# Patient Record
Sex: MALE | Race: WHITE | NOT HISPANIC OR LATINO | Employment: OTHER | URBAN - NONMETROPOLITAN AREA
[De-identification: names, ages, dates, MRNs, and addresses within clinical notes are randomized per-mention and may not be internally consistent; named-entity substitution may affect disease eponyms.]

---

## 2022-05-17 ENCOUNTER — TELEPHONE (OUTPATIENT)
Dept: FAMILY MEDICINE CLINIC | Facility: CLINIC | Age: 67
End: 2022-05-17

## 2022-05-26 ENCOUNTER — OFFICE VISIT (OUTPATIENT)
Dept: FAMILY MEDICINE CLINIC | Facility: CLINIC | Age: 67
End: 2022-05-26

## 2022-05-26 VITALS — DIASTOLIC BLOOD PRESSURE: 75 MMHG | HEART RATE: 62 BPM | OXYGEN SATURATION: 99 % | SYSTOLIC BLOOD PRESSURE: 120 MMHG

## 2022-05-26 DIAGNOSIS — I69.354 HEMIPARESIS AFFECTING LEFT SIDE AS LATE EFFECT OF CEREBROVASCULAR ACCIDENT: ICD-10-CM

## 2022-05-26 DIAGNOSIS — R91.1 PULMONARY NODULE: ICD-10-CM

## 2022-05-26 DIAGNOSIS — E78.2 MIXED HYPERLIPIDEMIA: ICD-10-CM

## 2022-05-26 DIAGNOSIS — I10 ESSENTIAL HYPERTENSION: ICD-10-CM

## 2022-05-26 DIAGNOSIS — Z86.73 HX OF TIA (TRANSIENT ISCHEMIC ATTACK) AND STROKE: Primary | ICD-10-CM

## 2022-05-26 DIAGNOSIS — F33.1 MAJOR DEPRESSIVE DISORDER, RECURRENT, MODERATE: ICD-10-CM

## 2022-05-26 DIAGNOSIS — R13.12 OROPHARYNGEAL DYSPHAGIA: ICD-10-CM

## 2022-05-26 PROBLEM — F33.41 RECURRENT MAJOR DEPRESSION IN PARTIAL REMISSION: Status: ACTIVE | Noted: 2022-05-26

## 2022-05-26 PROBLEM — Z72.0 TOBACCO USE: Status: ACTIVE | Noted: 2022-05-26

## 2022-05-26 PROBLEM — J20.9 COPD (CHRONIC OBSTRUCTIVE PULMONARY DISEASE) WITH ACUTE BRONCHITIS: Status: ACTIVE | Noted: 2022-05-26

## 2022-05-26 PROBLEM — J44.0 COPD (CHRONIC OBSTRUCTIVE PULMONARY DISEASE) WITH ACUTE BRONCHITIS: Status: ACTIVE | Noted: 2022-05-26

## 2022-05-26 PROBLEM — R13.10 DYSPHAGIA: Status: ACTIVE | Noted: 2022-05-26

## 2022-05-26 PROBLEM — I63.9 EMBOLIC STROKE: Status: ACTIVE | Noted: 2022-05-26

## 2022-05-26 PROBLEM — R41.841 COGNITIVE COMMUNICATION DISORDER: Status: ACTIVE | Noted: 2022-05-26

## 2022-05-26 PROCEDURE — 36415 COLL VENOUS BLD VENIPUNCTURE: CPT | Performed by: FAMILY MEDICINE

## 2022-05-26 PROCEDURE — 99214 OFFICE O/P EST MOD 30 MIN: CPT | Performed by: FAMILY MEDICINE

## 2022-05-26 RX ORDER — TRAZODONE HYDROCHLORIDE 50 MG/1
TABLET ORAL
COMMUNITY
Start: 2022-03-10 | End: 2022-11-29 | Stop reason: SDUPTHER

## 2022-05-26 RX ORDER — ATORVASTATIN CALCIUM 40 MG/1
40 TABLET, FILM COATED ORAL NIGHTLY
Qty: 90 TABLET | Refills: 1 | Status: SHIPPED | OUTPATIENT
Start: 2022-05-26 | End: 2022-11-28 | Stop reason: SDUPTHER

## 2022-05-26 RX ORDER — ATORVASTATIN CALCIUM 40 MG/1
40 TABLET, FILM COATED ORAL NIGHTLY
COMMUNITY
Start: 2022-05-07 | End: 2022-05-26 | Stop reason: SDUPTHER

## 2022-05-26 RX ORDER — FLUOXETINE HYDROCHLORIDE 20 MG/1
20 CAPSULE ORAL 3 TIMES DAILY
COMMUNITY
Start: 2022-02-27 | End: 2022-08-11

## 2022-05-26 NOTE — PROGRESS NOTES
Chief Complaint  Ear Fullness, Choking, congestion in  lungs, feet and leg pain, and sleeps to much    Subjective          Bon Noble presents to [facility]  Patient comes in complaining that he is having more problems recently with his dysphagia.  He states that he is having some choking symptoms as well and states that he has had some problems with his ears as well he states that otherwise he is doing about his normal thing except he has been sleeping a lot.  His sister does state that he needs repeat CT scan for his pulmonary nodule follow-up which she had a new 1 it was noted 3 months ago on a scan      Objective   Vital Signs:   /75   Pulse 62   SpO2 99%     There is no height or weight on file to calculate BMI.    Review of Systems   Constitutional: Positive for fatigue.   HENT: Negative for congestion, dental problem, ear discharge, ear pain and sore throat.    Respiratory: Positive for cough and choking. Negative for apnea, chest tightness and shortness of breath.    Gastrointestinal: Negative for constipation and nausea.   Endocrine: Negative for polyuria.   Genitourinary: Negative for difficulty urinating.   Musculoskeletal: Positive for gait problem. Negative for arthralgias.   Skin: Negative for rash.   Neurological: Positive for speech difficulty and numbness.   Hematological: Negative for adenopathy.       Past History:  Medical History: has a past medical history of Acute asthmatic bronchitis, Acute cerebrovascular accident (CVA) due to embolism of right middle cerebral artery (HCC), Acute non-recurrent maxillary sinusitis, Advance care planning, At high risk for falls, BMI 26.0-26.9,adult, Cataract of left eye, Chronic fatigue, Chronic osteoarthritis, Colon cancer screening, COPD (chronic obstructive pulmonary disease) with acute bronchitis (HCC), Depression, Dysarthria, Encounter for general adult medical examination w/o abnormal findings, Encounter for  screening for depression, Essential hypertension, Hemiparesis affecting left side as late effect of cerebrovascular accident (HCC), Hemiplegia ([facility]), High risk medication use, History of dysphagia, History of pneumonia, Medicare annual wellness visit, subsequent, Pulmonary nodule, Recurrent major depressive disorder in partial remission ([facility]), Renal insufficiency syndrome, Renal mass, left, Stroke (cerebrum) ([facility]) (07/2020), Tobacco use, and Wheelchair bound.   Surgical History: has no past surgical history on file.         Current Outpatient Medications:   •  atorvastatin (LIPITOR) 40 MG tablet, Take 1 tablet by mouth Every Night., Disp: 90 tablet, Rfl: 1  •  FLUoxetine (PROzac) 20 MG capsule, Take 20 mg by mouth 3 (Three) Times a Day., Disp: , Rfl:   •  traZODone (DESYREL) 50 MG tablet, , Disp: , Rfl:     Allergies: Atorvastatin and Ezetimibe    Physical Exam  Vitals reviewed. Exam conducted with a chaperone present.   Constitutional:       Appearance: Normal appearance.   HENT:      Head: Normocephalic.      Right Ear: Tympanic membrane, ear canal and external ear normal.      Left Ear: Tympanic membrane, ear canal and external ear normal.      Nose: Nose normal.      Mouth/Throat:      Pharynx: Oropharynx is clear.   Eyes:      Pupils: Pupils are equal, round, and reactive to light.   Cardiovascular:      Rate and Rhythm: Normal rate and regular rhythm.      Pulses: Normal pulses.   Pulmonary:      Effort: Pulmonary effort is normal.      Breath sounds: Normal breath sounds.   Abdominal:      General: Abdomen is flat. Bowel sounds are normal.      Palpations: Abdomen is soft.   Musculoskeletal:         General: Normal range of motion.   Skin:     General: Skin is warm and dry.   Neurological:      General: No focal deficit present.      Mental Status: He is alert and oriented to person, place, and time.      Comments: Left side hemiparesis patient also with very limited speaking ability          Result Review :                    Assessment and Plan    Diagnoses and all orders for this visit:    1. Hx of TIA (transient ischemic attack) and stroke (Primary)  Comments:  Patient with left hemiparesis wheelchair-bound continues with difficulties with dysphagia    2. Essential hypertension  Comments:  Patient now off all medications because his blood pressure has gotten normalized    3. Oropharyngeal dysphagia  Comments:  Discussed foods thickening of liquids and other options which are very limited    4. Hemiparesis affecting left side as late effect of cerebrovascular accident (HCC)  Comments:  Stable    5. Mixed hyperlipidemia  Comments:  Blood work and refill meds  Orders:  -     atorvastatin (LIPITOR) 40 MG tablet; Take 1 tablet by mouth Every Night.  Dispense: 90 tablet; Refill: 1  -     Comprehensive Metabolic Panel; Future  -     Lipid Panel; Future  -     Comprehensive Metabolic Panel  -     Lipid Panel    6. Major depressive disorder, recurrent, moderate (HCC)  Comments:  Continue meds did discuss stopping trazodone that helps with his sleep    7. Pulmonary nodule  Comments:  arRange follow-up CT scan  Orders:  -     CT Chest With Contrast Diagnostic; Future              Follow Up   Return in about 3 months (around 8/26/2022).  Patient was given instructions and counseling regarding his condition or for health maintenance advice. Please see specific information pulled into the AVS if appropriate.     Lexa Mohr MD

## 2022-05-27 LAB
ALBUMIN SERPL-MCNC: 4.1 G/DL (ref 3.8–4.8)
ALBUMIN/GLOB SERPL: 1.3 {RATIO} (ref 1.2–2.2)
ALP SERPL-CCNC: 89 IU/L (ref 44–121)
ALT SERPL-CCNC: 20 IU/L (ref 0–44)
AST SERPL-CCNC: 18 IU/L (ref 0–40)
BILIRUB SERPL-MCNC: 0.7 MG/DL (ref 0–1.2)
BUN SERPL-MCNC: 15 MG/DL (ref 8–27)
BUN/CREAT SERPL: 11 (ref 10–24)
CALCIUM SERPL-MCNC: 9.3 MG/DL (ref 8.6–10.2)
CHLORIDE SERPL-SCNC: 104 MMOL/L (ref 96–106)
CHOLEST SERPL-MCNC: 150 MG/DL (ref 100–199)
CO2 SERPL-SCNC: 22 MMOL/L (ref 20–29)
CREAT SERPL-MCNC: 1.33 MG/DL (ref 0.76–1.27)
EGFRCR SERPLBLD CKD-EPI 2021: 58 ML/MIN/1.73
GLOBULIN SER CALC-MCNC: 3.2 G/DL (ref 1.5–4.5)
GLUCOSE SERPL-MCNC: 120 MG/DL (ref 65–99)
HDLC SERPL-MCNC: 53 MG/DL
LDLC SERPL CALC-MCNC: 65 MG/DL (ref 0–99)
POTASSIUM SERPL-SCNC: 4.1 MMOL/L (ref 3.5–5.2)
PROT SERPL-MCNC: 7.3 G/DL (ref 6–8.5)
SODIUM SERPL-SCNC: 141 MMOL/L (ref 134–144)
TRIGL SERPL-MCNC: 193 MG/DL (ref 0–149)
VLDLC SERPL CALC-MCNC: 32 MG/DL (ref 5–40)

## 2022-08-11 RX ORDER — FLUOXETINE HYDROCHLORIDE 20 MG/1
CAPSULE ORAL
Qty: 270 CAPSULE | Refills: 0 | Status: SHIPPED | OUTPATIENT
Start: 2022-08-11 | End: 2022-11-28 | Stop reason: SDUPTHER

## 2022-11-14 DIAGNOSIS — E78.2 MIXED HYPERLIPIDEMIA: ICD-10-CM

## 2022-11-14 RX ORDER — FLUOXETINE HYDROCHLORIDE 20 MG/1
60 CAPSULE ORAL EVERY MORNING
Qty: 270 CAPSULE | Refills: 0 | OUTPATIENT
Start: 2022-11-14

## 2022-11-14 RX ORDER — TRAZODONE HYDROCHLORIDE 50 MG/1
TABLET ORAL
Qty: 90 TABLET | OUTPATIENT
Start: 2022-11-14

## 2022-11-14 RX ORDER — ATORVASTATIN CALCIUM 40 MG/1
TABLET, FILM COATED ORAL
Qty: 90 TABLET | Refills: 1 | OUTPATIENT
Start: 2022-11-14

## 2022-11-14 NOTE — TELEPHONE ENCOUNTER
Caller: Pamela Inman    Relationship: Emergency Contact    Best call back number: [phone]    Requested Prescriptions:   Requested Prescriptions     Pending Prescriptions Disp Refills   • FLUoxetine (PROzac) 20 MG capsule 270 capsule 0     Sig: Take 3 capsules by mouth Every Morning.        Pharmacy where request should be sent: [facility] [id number] [place] [id number] [facility] [address] - [phone]  - [phone] FX     Additional details provided by patient:     Does the patient have less than a 3 day supply:  [] Yes  [x] No    Cadance Dunaway, RegSched Rep   11/14/22 12:15 EST

## 2022-11-25 DIAGNOSIS — E78.2 MIXED HYPERLIPIDEMIA: ICD-10-CM

## 2022-11-28 ENCOUNTER — TELEPHONE (OUTPATIENT)
Dept: FAMILY MEDICINE CLINIC | Facility: CLINIC | Age: 67
End: 2022-11-28

## 2022-11-28 DIAGNOSIS — E78.2 MIXED HYPERLIPIDEMIA: ICD-10-CM

## 2022-11-28 RX ORDER — ATORVASTATIN CALCIUM 40 MG/1
40 TABLET, FILM COATED ORAL NIGHTLY
Qty: 90 TABLET | Refills: 1 | Status: SHIPPED | OUTPATIENT
Start: 2022-11-28

## 2022-11-28 RX ORDER — FLUOXETINE HYDROCHLORIDE 20 MG/1
60 CAPSULE ORAL EVERY MORNING
Qty: 270 CAPSULE | Refills: 0 | Status: SHIPPED | OUTPATIENT
Start: 2022-11-28 | End: 2022-12-06 | Stop reason: SDUPTHER

## 2022-11-28 RX ORDER — TRAZODONE HYDROCHLORIDE 50 MG/1
TABLET ORAL
Qty: 90 TABLET | OUTPATIENT
Start: 2022-11-28

## 2022-11-28 RX ORDER — ATORVASTATIN CALCIUM 40 MG/1
TABLET, FILM COATED ORAL
Qty: 90 TABLET | Refills: 1 | OUTPATIENT
Start: 2022-11-28

## 2022-11-28 NOTE — TELEPHONE ENCOUNTER
Caller: Pamela Inman    Relationship: Emergency Contact    Best call back number: [phone]    Requested Prescriptions:   Requested Prescriptions     Pending Prescriptions Disp Refills   • atorvastatin (LIPITOR) 40 MG tablet 90 tablet 1     Sig: Take 1 tablet by mouth Every Night.   • FLUoxetine (PROzac) 20 MG capsule 270 capsule 0     Sig: Take 3 capsules by mouth Every Morning.       traZODone (DESYREL) 50 MG tablet         Pharmacy where request should be sent: [facility] [id number] - [phone]  - [phone] FX     Additional details provided by patient: PATIENT WILL BE OUT OF HIS PRESCRIPTION WITHIN A WEEK.    PATIENTS SISTER REQUESTS CALL BACK IF THERE IS A PROBLEM WITH REFILLS.  STATED HER BROTHER IS WHEELCHAIR BOUND, AND UNABLE TO WALK, AND SPEAK.  IF NEEDING AN APPOINTMENT IF THEY CAN SCHEDULE TELEHEALTH.    Does the patient have less than a 3 day supply:  [x] Yes  [] No    Ministerio Espinoza Rep   11/28/22 10:03 EST

## 2022-11-28 NOTE — TELEPHONE ENCOUNTER
Patient's POA, Pamela, said that the atorvastatin and fluoxetine was sent in but the Trazodone wasn't. She said that he needs this medication and would like it sent in to the pharmacy. She said that she will call tomorrow or Wednesday and schedule a virtual appt for her brother. Ph: [phone].

## 2022-11-29 RX ORDER — TRAZODONE HYDROCHLORIDE 50 MG/1
50 TABLET ORAL NIGHTLY
Qty: 90 TABLET | Refills: 0 | Status: SHIPPED | OUTPATIENT
Start: 2022-11-29 | End: 2022-12-06 | Stop reason: SDUPTHER

## 2022-12-06 ENCOUNTER — OFFICE VISIT (OUTPATIENT)
Dept: FAMILY MEDICINE CLINIC | Facility: CLINIC | Age: 67
End: 2022-12-06

## 2022-12-06 DIAGNOSIS — K59.01 SLOW TRANSIT CONSTIPATION: ICD-10-CM

## 2022-12-06 DIAGNOSIS — E78.2 MIXED HYPERLIPIDEMIA: ICD-10-CM

## 2022-12-06 DIAGNOSIS — I10 ESSENTIAL HYPERTENSION: ICD-10-CM

## 2022-12-06 DIAGNOSIS — Z86.73 HX OF TIA (TRANSIENT ISCHEMIC ATTACK) AND STROKE: Primary | ICD-10-CM

## 2022-12-06 DIAGNOSIS — I69.354 HEMIPARESIS AFFECTING LEFT SIDE AS LATE EFFECT OF CEREBROVASCULAR ACCIDENT: ICD-10-CM

## 2022-12-06 DIAGNOSIS — F33.1 MAJOR DEPRESSIVE DISORDER, RECURRENT, MODERATE: ICD-10-CM

## 2022-12-06 PROCEDURE — 99443 PR PHYS/QHP TELEPHONE EVALUATION 21-30 MIN: CPT | Performed by: FAMILY MEDICINE

## 2022-12-06 RX ORDER — FLUOXETINE HYDROCHLORIDE 20 MG/1
60 CAPSULE ORAL EVERY MORNING
Qty: 270 CAPSULE | Refills: 0 | Status: SHIPPED | OUTPATIENT
Start: 2022-12-06 | End: 2022-12-06 | Stop reason: SDUPTHER

## 2022-12-06 RX ORDER — FLUOXETINE HYDROCHLORIDE 20 MG/1
60 CAPSULE ORAL EVERY MORNING
Qty: 270 CAPSULE | Refills: 0 | Status: SHIPPED | OUTPATIENT
Start: 2022-12-06

## 2022-12-06 RX ORDER — TRAZODONE HYDROCHLORIDE 50 MG/1
50 TABLET ORAL NIGHTLY
Qty: 90 TABLET | Refills: 0 | Status: SHIPPED | OUTPATIENT
Start: 2022-12-06

## 2022-12-06 NOTE — PROGRESS NOTES
Chief Complaint  history TIA    Subjective          Guido Joe presents to [facility]  History of Present Illness  Patient comes in today for recheck on his medications he is actually had a lot of weakness decreased ability to walk and is becoming more more bedridden he refuses to do much else at this point in time.  He has difficult time communicating and his sister has basically been doing most of the talking for him and has been his caregiver for the most part.  She does have his visit done today through the telephone because they could not get A/V connection and he is unwilling to get out of bed today for his appointment.  They do consent to have it done through the telephone today as well the patient is at home I am in the clinic      Objective   Vital Signs:   There were no vitals taken for this visit.    There is no height or weight on file to calculate BMI.    Review of Systems   Constitutional: Positive for fatigue.   HENT: Negative for congestion, dental problem, ear discharge, ear pain and sore throat.    Respiratory: Negative for apnea, chest tightness and shortness of breath.    Gastrointestinal: Positive for indigestion. Negative for constipation and nausea.   Endocrine: Negative for polyuria.   Genitourinary: Positive for urgency. Negative for difficulty urinating.   Musculoskeletal: Positive for arthralgias and gait problem.   Skin: Negative for rash.   Neurological: Positive for tremors and numbness.   Hematological: Negative for adenopathy.       Past History:  Medical History: has a past medical history of Acute asthmatic bronchitis, Acute cerebrovascular accident (CVA) due to embolism of right middle cerebral artery (HCC), Acute non-recurrent maxillary sinusitis, Advance care planning, At high risk for falls, BMI 26.0-26.9,adult, Cataract of left eye, Chronic fatigue, Chronic osteoarthritis, Colon cancer screening, COPD (chronic obstructive pulmonary disease)  with acute bronchitis (HCC), Depression, Dysarthria, Encounter for general adult medical examination w/o abnormal findings, Encounter for screening for depression, Essential hypertension, Hemiparesis affecting left side as late effect of cerebrovascular accident (HCC), Hemiplegia (HCC), High risk medication use, History of dysphagia, History of pneumonia, Medicare annual wellness visit, subsequent, Pulmonary nodule, Recurrent major depressive disorder in partial remission (HCC), Renal insufficiency syndrome, Renal mass, left, Stroke (cerebrum) ([facility]) (07/2020), Tobacco use, and Wheelchair bound.   Surgical History: has no past surgical history on file.         Current Outpatient Medications:   •  FLUoxetine (PROzac) 20 MG capsule, Take 3 capsules by mouth Every Morning., Disp: 270 capsule, Rfl: 0  •  traZODone (DESYREL) 50 MG tablet, Take 1 tablet by mouth Every Night., Disp: 90 tablet, Rfl: 0  •  atorvastatin (LIPITOR) 40 MG tablet, Take 1 tablet by mouth Every Night., Disp: 90 tablet, Rfl: 1    Allergies: Atorvastatin and Ezetimibe    Physical Exam  Neurological:      Mental Status: Mental status is at baseline.          Result Review :                   Assessment and Plan    Diagnoses and all orders for this visit:    1. Hx of TIA (transient ischemic attack) and stroke (Primary)  Comments:  Discussed with caregiver therapy blood work and medication    2. Hemiparesis affecting left side as late effect of cerebrovascular accident (HCC)  Comments:  Patient discussed therapy and monitor    3. Mixed hyperlipidemia  Comments:  We will get blood work  Orders:  -     Lipid Panel; Future    4. Essential hypertension  Comments:  Continue medications will monitor advised to try to check blood pressure  Orders:  -     CBC & Differential; Future  -     Comprehensive Metabolic Panel; Future    5. Major depressive disorder, recurrent, moderate (HCC)  Comments:  Stable discussed with family treatment but patient refuses    6.  Slow transit constipation  Comments:  Increase MiraLAX to 2 scoops a day    Other orders  -     FLUoxetine (PROzac) 20 MG capsule; Take 3 capsules by mouth Every Morning.  Dispense: 270 capsule; Refill: 0  -     traZODone (DESYREL) 50 MG tablet; Take 1 tablet by mouth Every Night.  Dispense: 90 tablet; Refill: 0      I spent 22 minutes caring for Guido on this date of service. This time includes time spent by me in the following activities:preparing for the visit, reviewing tests, counseling and educating the patient/family/caregiver, ordering medications, tests, or procedures and documenting information in the medical record        Follow Up   No follow-ups on file.  Patient was given instructions and counseling regarding his condition or for health maintenance advice. Please see specific information pulled into the AVS if appropriate.     Lexa Mohr MD

## 2023-01-06 ENCOUNTER — LAB (OUTPATIENT)
Dept: FAMILY MEDICINE CLINIC | Facility: CLINIC | Age: 68
End: 2023-01-06
Payer: MEDICARE

## 2023-01-06 DIAGNOSIS — E78.2 MIXED HYPERLIPIDEMIA: ICD-10-CM

## 2023-01-06 DIAGNOSIS — I10 ESSENTIAL HYPERTENSION: ICD-10-CM

## 2023-01-06 PROCEDURE — 36415 COLL VENOUS BLD VENIPUNCTURE: CPT | Performed by: FAMILY MEDICINE

## 2023-01-07 LAB
ALBUMIN SERPL-MCNC: 4.2 G/DL (ref 3.8–4.8)
ALBUMIN/GLOB SERPL: 1.4 {RATIO} (ref 1.2–2.2)
ALP SERPL-CCNC: 92 IU/L (ref 44–121)
ALT SERPL-CCNC: 23 IU/L (ref 0–44)
AST SERPL-CCNC: 18 IU/L (ref 0–40)
BASOPHILS # BLD AUTO: 0.1 X10E3/UL (ref 0–0.2)
BASOPHILS NFR BLD AUTO: 1 %
BILIRUB SERPL-MCNC: 1 MG/DL (ref 0–1.2)
BUN SERPL-MCNC: 15 MG/DL (ref 8–27)
BUN/CREAT SERPL: 12 (ref 10–24)
CALCIUM SERPL-MCNC: 9.5 MG/DL (ref 8.6–10.2)
CHLORIDE SERPL-SCNC: 106 MMOL/L (ref 96–106)
CHOLEST SERPL-MCNC: 151 MG/DL (ref 100–199)
CO2 SERPL-SCNC: 26 MMOL/L (ref 20–29)
CREAT SERPL-MCNC: 1.26 MG/DL (ref 0.76–1.27)
EGFRCR SERPLBLD CKD-EPI 2021: 62 ML/MIN/1.73
EOSINOPHIL # BLD AUTO: 0.2 X10E3/UL (ref 0–0.4)
EOSINOPHIL NFR BLD AUTO: 2 %
ERYTHROCYTE [DISTWIDTH] IN BLOOD BY AUTOMATED COUNT: 12.8 % (ref 11.6–15.4)
GLOBULIN SER CALC-MCNC: 3 G/DL (ref 1.5–4.5)
GLUCOSE SERPL-MCNC: 86 MG/DL (ref 70–99)
HCT VFR BLD AUTO: 50.2 % (ref 37.5–51)
HDLC SERPL-MCNC: 53 MG/DL
HGB BLD-MCNC: 17.7 G/DL (ref 13–17.7)
IMM GRANULOCYTES # BLD AUTO: 0 X10E3/UL (ref 0–0.1)
IMM GRANULOCYTES NFR BLD AUTO: 0 %
LDLC SERPL CALC-MCNC: 65 MG/DL (ref 0–99)
LYMPHOCYTES # BLD AUTO: 1.3 X10E3/UL (ref 0.7–3.1)
LYMPHOCYTES NFR BLD AUTO: 16 %
MCH RBC QN AUTO: 33.5 PG (ref 26.6–33)
MCHC RBC AUTO-ENTMCNC: 35.3 G/DL (ref 31.5–35.7)
MCV RBC AUTO: 95 FL (ref 79–97)
MONOCYTES # BLD AUTO: 0.7 X10E3/UL (ref 0.1–0.9)
MONOCYTES NFR BLD AUTO: 8 %
NEUTROPHILS # BLD AUTO: 6.1 X10E3/UL (ref 1.4–7)
NEUTROPHILS NFR BLD AUTO: 73 %
PLATELET # BLD AUTO: 185 X10E3/UL (ref 150–450)
POTASSIUM SERPL-SCNC: 4.4 MMOL/L (ref 3.5–5.2)
PROT SERPL-MCNC: 7.2 G/DL (ref 6–8.5)
RBC # BLD AUTO: 5.29 X10E6/UL (ref 4.14–5.8)
SODIUM SERPL-SCNC: 145 MMOL/L (ref 134–144)
TRIGL SERPL-MCNC: 199 MG/DL (ref 0–149)
VLDLC SERPL CALC-MCNC: 33 MG/DL (ref 5–40)
WBC # BLD AUTO: 8.3 X10E3/UL (ref 3.4–10.8)

## 2023-01-09 ENCOUNTER — TELEPHONE (OUTPATIENT)
Dept: FAMILY MEDICINE CLINIC | Facility: CLINIC | Age: 68
End: 2023-01-09
Payer: MEDICARE

## 2023-05-12 DIAGNOSIS — E78.2 MIXED HYPERLIPIDEMIA: ICD-10-CM

## 2023-05-12 RX ORDER — TRAZODONE HYDROCHLORIDE 50 MG/1
50 TABLET ORAL NIGHTLY
Qty: 90 TABLET | Refills: 0 | Status: SHIPPED | OUTPATIENT
Start: 2023-05-12

## 2023-05-12 RX ORDER — FLUOXETINE HYDROCHLORIDE 20 MG/1
60 CAPSULE ORAL EVERY MORNING
Qty: 270 CAPSULE | Refills: 0 | Status: SHIPPED | OUTPATIENT
Start: 2023-05-12

## 2023-05-12 RX ORDER — ATORVASTATIN CALCIUM 40 MG/1
40 TABLET, FILM COATED ORAL NIGHTLY
Qty: 90 TABLET | Refills: 1 | Status: SHIPPED | OUTPATIENT
Start: 2023-05-12

## 2023-05-12 NOTE — TELEPHONE ENCOUNTER
Caller: Pamela Inman    Relationship: Emergency Contact    Best call back number: [phone]    Requested Prescriptions:   Requested Prescriptions     Pending Prescriptions Disp Refills   • FLUoxetine (PROzac) 20 MG capsule 270 capsule 0     Sig: Take 3 capsules by mouth Every Morning.   • atorvastatin (LIPITOR) 40 MG tablet 90 tablet 1     Sig: Take 1 tablet by mouth Every Night.   • traZODone (DESYREL) 50 MG tablet 90 tablet 0     Sig: Take 1 tablet by mouth Every Night.        Pharmacy where request should be sent: [facility] [id number] [place] [id number] [facility] [address] - [phone] [facility] [phone] FX     Last office visit with prescribing clinician: 12/6/2022   Last telemedicine visit with prescribing clinician: 1/9/2023   Next office visit with prescribing clinician: Visit date not found     Does the patient have less than a 3 day supply:  [] Yes  [x] No    Would you like a call back once the refill request has been completed: [x] Yes [] No    If the office needs to give you a call back, can they leave a voicemail: [x] Yes [] No    Ministerio Dang Rep   05/12/23 10:37 EDT

## 2023-08-15 ENCOUNTER — TELEPHONE (OUTPATIENT)
Dept: FAMILY MEDICINE CLINIC | Facility: CLINIC | Age: 68
End: 2023-08-15

## 2023-08-15 ENCOUNTER — TELEMEDICINE (OUTPATIENT)
Dept: FAMILY MEDICINE CLINIC | Facility: CLINIC | Age: 68
End: 2023-08-15
Payer: MEDICARE

## 2023-08-15 DIAGNOSIS — I10 ESSENTIAL HYPERTENSION: ICD-10-CM

## 2023-08-15 DIAGNOSIS — I69.354 HEMIPARESIS AFFECTING LEFT SIDE AS LATE EFFECT OF CEREBROVASCULAR ACCIDENT: ICD-10-CM

## 2023-08-15 DIAGNOSIS — E78.2 MIXED HYPERLIPIDEMIA: ICD-10-CM

## 2023-08-15 DIAGNOSIS — F33.1 MAJOR DEPRESSIVE DISORDER, RECURRENT, MODERATE: ICD-10-CM

## 2023-08-15 DIAGNOSIS — Z86.73 HX OF TIA (TRANSIENT ISCHEMIC ATTACK) AND STROKE: Primary | ICD-10-CM

## 2023-08-15 PROCEDURE — 99213 OFFICE O/P EST LOW 20 MIN: CPT | Performed by: FAMILY MEDICINE

## 2023-08-15 RX ORDER — TRAZODONE HYDROCHLORIDE 50 MG/1
50 TABLET ORAL NIGHTLY
Qty: 90 TABLET | Refills: 1 | Status: SHIPPED | OUTPATIENT
Start: 2023-08-15

## 2023-08-15 RX ORDER — FLUOXETINE HYDROCHLORIDE 20 MG/1
60 CAPSULE ORAL EVERY MORNING
Qty: 270 CAPSULE | Refills: 0 | Status: SHIPPED | OUTPATIENT
Start: 2023-08-15

## 2023-08-15 RX ORDER — TRAZODONE HYDROCHLORIDE 50 MG/1
50 TABLET ORAL NIGHTLY
Qty: 30 TABLET | Refills: 0 | Status: SHIPPED | OUTPATIENT
Start: 2023-08-15 | End: 2023-08-15 | Stop reason: SDUPTHER

## 2023-08-15 NOTE — TELEPHONE ENCOUNTER
Caller: Pamela Inman    Relationship to patient: Emergency Contact    Best call back number: [phone]     Patient is needing: PATIENTS SISTER REQUESTING IF DOCTOR GIL CAN SEND A 90 DAY REFILL OF HER BROTHERS TRAZADONE.    STATED SHE FORGOT TO REQUEST EARLIER WHEN SHE ASKED FOR HIS REFILL.    REQUESTS CALL BACK WHEN PRESCRIPTION HAS BEEN SENT.    PHARMACY:    [facility] [id number] [place] [id number] [facility] [address] - [phone]  - [phone]  [phone]

## 2023-08-15 NOTE — TELEPHONE ENCOUNTER
Caller: Pamela Inman    Relationship: Emergency Contact    Best call back number: [phone]     Requested Prescriptions:   Requested Prescriptions     Pending Prescriptions Disp Refills    traZODone (DESYREL) 50 MG tablet 90 tablet 0     Sig: Take 1 tablet by mouth Every Night.        Pharmacy where request should be sent: [facility] [id number] [place] [id number] [facility] [address] - [phone] PH - [phone] FX     Last office visit with prescribing clinician: 12/6/2022   Last telemedicine visit with prescribing clinician: Visit date not found   Next office visit with prescribing clinician: 8/15/2023     Additional details provided by patient: PATIENTS SISTER STATES HE HAS A LITTLE OVER A WEEK LEFT OF MEDICATION.     PLEASE CALL BACK WHEN THE REQUEST HAS BEEN COMPLETED, IF NO ANSWER LEAVE A DETAILED MESSAGE.    Ministerio Spivey Rep   08/15/23 08:32 EDT

## 2023-08-15 NOTE — PROGRESS NOTES
Chief Complaint  Hypertension, Cerebrovascular Accident, and L side weakness    Subjective          Guido Joe presents to [facility]  History of Present Illness  We will comes in today basically he is with his sister who is primary caregiver she basically does most of his talking for him since his stroke he has basically has difficulty in speaking swallowing and using his left side he is they are present that basically he communicates with her and she communicates with me today.  He does consent through her to have his visit done through [email or web address] he is at home I am in the clinic patient also states that he needs to get his medications refilled and does not really want blood work at this time  Hypertension  Pertinent negatives include no shortness of breath. Hypertensive end-organ damage includes CVA.   Cerebrovascular Accident  Associated symptoms include weakness. Pertinent negatives include no arthralgias, congestion, nausea, rash or sore throat.     Objective   Vital Signs:   There were no vitals taken for this visit.    There is no height or weight on file to calculate BMI.    Review of Systems   HENT:  Negative for congestion, dental problem, ear discharge, ear pain and sore throat.    Respiratory:  Negative for apnea, chest tightness and shortness of breath.    Gastrointestinal:  Negative for constipation and nausea.        Dysphagia   Endocrine: Negative for polyuria.   Genitourinary:  Negative for difficulty urinating.   Musculoskeletal:  Positive for gait problem. Negative for arthralgias.   Skin:  Negative for rash.   Neurological:  Positive for weakness, memory problem and confusion.   Hematological:  Negative for adenopathy.   Psychiatric/Behavioral:  Positive for depressed mood. The patient is nervous/anxious.      Past History:  Medical History: has a past medical history of Acute asthmatic bronchitis, Acute cerebrovascular accident (CVA) due to embolism of right  middle cerebral artery, Acute non-recurrent maxillary sinusitis, Advance care planning, At high risk for falls, BMI 26.0-26.9,adult, Cataract of left eye, Chronic fatigue, Chronic osteoarthritis, Colon cancer screening, COPD (chronic obstructive pulmonary disease) with acute bronchitis, Depression, Dysarthria, Encounter for general adult medical examination w/o abnormal findings, Encounter for screening for depression, Essential hypertension, Hemiparesis affecting left side as late effect of cerebrovascular accident, Hemiplegia, High risk medication use, History of dysphagia, History of pneumonia, Medicare annual wellness visit, subsequent, Pulmonary nodule, Recurrent major depressive disorder in partial remission, Renal insufficiency syndrome, Renal mass, left, Stroke (cerebrum) (07/2020), Tobacco use, and Wheelchair bound.   Surgical History: has no past surgical history on file.         Current Outpatient Medications:     FLUoxetine (PROzac) 20 MG capsule, Take 3 capsules by mouth Every Morning., Disp: 270 capsule, Rfl: 0    traZODone (DESYREL) 50 MG tablet, Take 1 tablet by mouth Every Night., Disp: 90 tablet, Rfl: 1    atorvastatin (LIPITOR) 40 MG tablet, Take 1 tablet by mouth Every Night., Disp: 90 tablet, Rfl: 1    Allergies: Atorvastatin and Ezetimibe    Physical Exam  HENT:      Head: Normocephalic.      Right Ear: External ear normal.      Left Ear: External ear normal.      Nose: Nose normal.   Eyes:      Pupils: Pupils are equal, round, and reactive to light.   Neurological:      Mental Status: He is alert. Mental status is at baseline.        Result Review :                   Assessment and Plan    Diagnoses and all orders for this visit:    1. Hx of TIA (transient ischemic attack) and stroke (Primary)  Comments:  Stable continue care by family    2. Mixed hyperlipidemia  Comments:  stable    3. Major depressive disorder, recurrent, moderate  Comments:  Continue medication    4. Hemiparesis affecting  left side as late effect of cerebrovascular accident  Comments:  Stable    5. Essential hypertension  Comments:  Monitor    Other orders  -     FLUoxetine (PROzac) 20 MG capsule; Take 3 capsules by mouth Every Morning.  Dispense: 270 capsule; Refill: 0  -     traZODone (DESYREL) 50 MG tablet; Take 1 tablet by mouth Every Night.  Dispense: 90 tablet; Refill: 1              Follow Up   No follow-ups on file.  Patient was given instructions and counseling regarding his condition or for health maintenance advice. Please see specific information pulled into the AVS if appropriate.     Lexa Mohr MD

## 2023-11-13 DIAGNOSIS — E78.2 MIXED HYPERLIPIDEMIA: ICD-10-CM

## 2023-11-13 NOTE — TELEPHONE ENCOUNTER
Caller: StormPamela    Relationship: Brother/Sister    Best call back number: [phone]     Requested Prescriptions:   Requested Prescriptions     Pending Prescriptions Disp Refills    atorvastatin (LIPITOR) 40 MG tablet 90 tablet 1     Sig: Take 1 tablet by mouth Every Night.        Pharmacy where request should be sent: [facility] [id number] [place] [id number] [facility] [address] - [phone]  - [phone] FX     Last office visit with prescribing clinician: 12/6/2022   Last telemedicine visit with prescribing clinician: 8/15/2023   Next office visit with prescribing clinician: Visit date not found     Additional details provided by patient: 90 DAY SUPPLY, OUT OF REFILLS    Does the patient have less than a 3 day supply:  [] Yes  [x] No    Would you like a call back once the refill request has been completed: [] Yes [x] No    If the office needs to give you a call back, can they leave a voicemail: [] Yes [x] No    Rene Banerjee, PCT   11/13/23 09:36 EST

## 2023-11-16 RX ORDER — ATORVASTATIN CALCIUM 40 MG/1
40 TABLET, FILM COATED ORAL NIGHTLY
Qty: 90 TABLET | Refills: 0 | Status: SHIPPED | OUTPATIENT
Start: 2023-11-16

## 2023-11-16 NOTE — TELEPHONE ENCOUNTER
PT SIBLING DOUG CALLED TO CHECK STATUS FOR RX  atorvastatin (LIPITOR) 40 MG tablet  REFILL.    PLEASE ADVISE.CALL BACK:[phone]

## 2024-01-15 ENCOUNTER — TELEMEDICINE (OUTPATIENT)
Dept: FAMILY MEDICINE CLINIC | Facility: CLINIC | Age: 69
End: 2024-01-15
Payer: MEDICARE

## 2024-01-15 VITALS — WEIGHT: 225 LBS

## 2024-01-15 DIAGNOSIS — R13.12 OROPHARYNGEAL DYSPHAGIA: ICD-10-CM

## 2024-01-15 DIAGNOSIS — I69.398 DEPRESSION DUE TO OLD STROKE: ICD-10-CM

## 2024-01-15 DIAGNOSIS — Z00.00 GENERAL MEDICAL EXAM: Primary | ICD-10-CM

## 2024-01-15 DIAGNOSIS — R41.841 COGNITIVE COMMUNICATION DISORDER: ICD-10-CM

## 2024-01-15 DIAGNOSIS — I69.354 HEMIPARESIS AFFECTING LEFT SIDE AS LATE EFFECT OF CEREBROVASCULAR ACCIDENT: ICD-10-CM

## 2024-01-15 DIAGNOSIS — Z11.59 NEED FOR HEPATITIS C SCREENING TEST: ICD-10-CM

## 2024-01-15 DIAGNOSIS — Z12.11 SCREENING FOR COLON CANCER: ICD-10-CM

## 2024-01-15 DIAGNOSIS — F32.0 MILD MAJOR DEPRESSION: ICD-10-CM

## 2024-01-15 DIAGNOSIS — G47.00 INSOMNIA, PERSISTENT: ICD-10-CM

## 2024-01-15 DIAGNOSIS — F06.31 DEPRESSION DUE TO OLD STROKE: ICD-10-CM

## 2024-01-15 DIAGNOSIS — Z86.73 HISTORY OF CVA (CEREBROVASCULAR ACCIDENT): ICD-10-CM

## 2024-01-15 DIAGNOSIS — E78.2 MIXED HYPERLIPIDEMIA: ICD-10-CM

## 2024-01-15 PROBLEM — F33.41 RECURRENT MAJOR DEPRESSION IN PARTIAL REMISSION: Status: RESOLVED | Noted: 2022-05-26 | Resolved: 2024-01-15

## 2024-01-15 PROBLEM — J20.9 COPD (CHRONIC OBSTRUCTIVE PULMONARY DISEASE) WITH ACUTE BRONCHITIS: Status: RESOLVED | Noted: 2022-05-26 | Resolved: 2024-01-15

## 2024-01-15 PROBLEM — J44.9 COPD MIXED TYPE: Status: ACTIVE | Noted: 2024-01-15

## 2024-01-15 PROBLEM — I10 ESSENTIAL HYPERTENSION: Status: RESOLVED | Noted: 2022-05-26 | Resolved: 2024-01-15

## 2024-01-15 PROBLEM — I63.9 EMBOLIC STROKE: Status: RESOLVED | Noted: 2022-05-26 | Resolved: 2024-01-15

## 2024-01-15 PROBLEM — J44.0 COPD (CHRONIC OBSTRUCTIVE PULMONARY DISEASE) WITH ACUTE BRONCHITIS: Status: RESOLVED | Noted: 2022-05-26 | Resolved: 2024-01-15

## 2024-01-15 PROBLEM — J44.9 COPD MIXED TYPE: Chronic | Status: RESOLVED | Noted: 2024-01-15 | Resolved: 2024-01-15

## 2024-01-15 PROBLEM — R13.10 DYSPHAGIA: Chronic | Status: ACTIVE | Noted: 2022-05-26

## 2024-01-15 PROBLEM — Z72.0 TOBACCO USE: Status: RESOLVED | Noted: 2022-05-26 | Resolved: 2024-01-15

## 2024-01-15 PROBLEM — J44.9 COPD MIXED TYPE: Chronic | Status: ACTIVE | Noted: 2024-01-15

## 2024-01-15 PROCEDURE — 99214 OFFICE O/P EST MOD 30 MIN: CPT | Performed by: FAMILY MEDICINE

## 2024-01-15 PROCEDURE — 96160 PT-FOCUSED HLTH RISK ASSMT: CPT | Performed by: FAMILY MEDICINE

## 2024-01-15 PROCEDURE — G0439 PPPS, SUBSEQ VISIT: HCPCS | Performed by: FAMILY MEDICINE

## 2024-01-15 RX ORDER — FLUOXETINE HYDROCHLORIDE 20 MG/1
60 CAPSULE ORAL EVERY MORNING
Qty: 270 CAPSULE | Refills: 1 | Status: SHIPPED | OUTPATIENT
Start: 2024-01-15

## 2024-01-15 RX ORDER — TRAZODONE HYDROCHLORIDE 50 MG/1
50 TABLET ORAL NIGHTLY
Qty: 90 TABLET | Refills: 1 | Status: SHIPPED | OUTPATIENT
Start: 2024-01-15

## 2024-01-15 RX ORDER — ATORVASTATIN CALCIUM 40 MG/1
40 TABLET, FILM COATED ORAL NIGHTLY
Qty: 90 TABLET | Refills: 1 | Status: SHIPPED | OUTPATIENT
Start: 2024-01-15

## 2024-01-15 RX ORDER — ASPIRIN 81 MG/1
81 TABLET, CHEWABLE ORAL EVERY MORNING
COMMUNITY
End: 2024-01-15 | Stop reason: SDUPTHER

## 2024-01-15 RX ORDER — ASPIRIN 81 MG/1
81 TABLET, CHEWABLE ORAL EVERY MORNING
Start: 2024-01-15

## 2024-01-15 NOTE — ASSESSMENT & PLAN NOTE
Has reached max benefit from therapy per his sister and POA    They are looking into assistance options at home for care with his health insurance

## 2024-01-15 NOTE — ASSESSMENT & PLAN NOTE
Problems with thin liquids after CVA    Refuses thickened liquids    Pts POA understands risks of aspiration

## 2024-01-15 NOTE — PROGRESS NOTES
Patient was seen today through synchronous audio/video technology. Verbal consent was obtained. The patient was located at home. Vitals signs were not obtained due to lack of home monitoring access.     I was located at our [facility] office for this telehealth visit.    Chief Complaint  Establish Care (He cannot stand and move around and his family needed suggestions on what the options are. They would like to get a 90 supply for all his medications )    History of Present Illness  Guido Joe is a 68 y.o. male presenting via video-conference today for new pt visit to [facility] given his past PCP is no longer with our office.     Connected via [email or web address] but had cell service difficulties and transitioned to [employer].  They have no internet at [facility] [place].     Pamela FISH present for today's visit    He had a stroke in July 2020 with L sided hemiparesis following his stroke. Affected speech, complete loss of L arm and leg and L foot drop.  Some cognitive decline after CVA    On low-dose ASA, atorvastatin for history of CVA and high cholesterol.    Continues on prozac for mood/depression and trazodone for insomnia.      The following portions of the patient's history were reviewed and updated as appropriate: allergies, current medications, past family history, past medical history, past social history, past surgical history and problem list.        The ABCs of the Annual Wellness Visit  Subsequent Medicare Wellness Visit    Subjective    Guido Joe is a 68 y.o. male who presents for a Subsequent Medicare Wellness Visit.    The following portions of the patient's history were reviewed and   updated as appropriate: allergies, current medications, past family history, past medical history, past social history, past surgical history, and problem list.    Compared to one year ago, the patient feels his physical   health is worse with progression of stroke complications.       Compared to one year ago, the patient feels his mental   health is the same.    Recent Hospitalizations:  He was not admitted to the hospital during the last year.       Current Medical Providers:  Patient Care Team:  Jonathan Florez MD as PCP - General (Family Medicine)    Outpatient Medications Prior to Visit   Medication Sig Dispense Refill    aspirin (Aspirin 81) 81 MG chewable tablet Chew 1 tablet Every Morning.      atorvastatin (LIPITOR) 40 MG tablet Take 1 tablet by mouth Every Night. 90 tablet 0    FLUoxetine (PROzac) 20 MG capsule Take 3 capsules by mouth Every Morning. 270 capsule 0    traZODone (DESYREL) 50 MG tablet Take 1 tablet by mouth Every Night. 90 tablet 1     No facility-administered medications prior to visit.       No opioid medication identified on active medication list. I have reviewed chart for other potential  high risk medication/s and harmful drug interactions in the elderly.        Aspirin is on active medication list. Aspirin use is indicated based on review of current medical condition/s. Pros and cons of this therapy have been discussed today. Benefits of this medication outweigh potential harm.  Patient has been encouraged to continue taking this medication.  .      Patient Active Problem List   Diagnosis    Cognitive communication disorder    Dysphagia    Hemiparesis affecting left side as late effect of cerebrovascular accident    General medical exam    History of CVA (cerebrovascular accident)    Mild major depression    Depression due to old stroke    Insomnia, persistent    Mixed hyperlipidemia    Screening for colon cancer    Need for hepatitis C screening test     Advance Care Planning   Advance Care Planning     Advance Directive is on file.  ACP discussion was held with the patient during this visit. Patient has an advance directive in EMR which is still valid.      Objective    Vitals:    01/15/24 1121   Weight: 102 kg (225 lb)  Comment: Family reported      There is no height or weight on file to calculate BMI.    BMI cannot be calculated due to outdated height or weight values.  Please input a current height/weight in Vitals and re-renter BMIFOLLOWUP in Note to pull in correct documentation based on BMI range.      Does the patient have evidence of cognitive impairment? Yes          HEALTH RISK ASSESSMENT    Smoking Status:  Social History     Tobacco Use   Smoking Status Former    Passive exposure: Never   Smokeless Tobacco Never     Alcohol Consumption:  Social History     Substance and Sexual Activity   Alcohol Use Defer     Fall Risk Screen:    GAMAL Fall Risk Assessment was completed, and patient is at HIGH risk for falls. Assessment completed on:1/15/2024    Depression Screenin/15/2024    11:55 AM   PHQ-2/PHQ-9 Depression Screening   Little Interest or Pleasure in Doing Things 0-->not at all   Feeling Down, Depressed or Hopeless 0-->not at all   PHQ-9: Brief Depression Severity Measure Score 0       Health Habits and Functional and Cognitive Screenin/15/2024    11:00 AM   Functional & Cognitive Status   Do you have difficulty preparing food and eating? Yes   Do you have difficulty bathing yourself, getting dressed or grooming yourself? Yes   Do you have difficulty using the toilet? Yes   Do you have difficulty moving around from place to place? Yes   Do you have trouble with steps or getting out of a bed or a chair? Yes   Current Diet Well Balanced Diet   Dental Exam Not up to date   Eye Exam Not up to date   Exercise (times per week) 0 times per week   Current Exercises Include No Regular Exercise   Do you need help using the phone?  Yes   Are you deaf or do you have serious difficulty hearing?  No   Do you need help to go to places out of walking distance? Yes   Do you need help shopping? Yes   Do you need help preparing meals?  Yes   Do you need help with housework?  Yes   Do you need help with laundry? Yes   Do you need help taking  your medications? Yes   Do you need help managing money? Yes   Do you ever drive or ride in a car without wearing a seat belt? No   Have you felt unusual stress, anger or loneliness in the last month? No   Who do you live with? Sibling   If you need help, do you have trouble finding someone available to you? No   Have you been bothered in the last four weeks by sexual problems? No   Do you have difficulty concentrating, remembering or making decisions? Yes       Age-appropriate Screening Schedule:  Refer to the list below for future screening recommendations based on patient's age, sex and/or medical conditions. Orders for these recommended tests are listed in the plan section. The patient has been provided with a written plan.    Health Maintenance   Topic Date Due    HEPATITIS C SCREENING  Never done    LIPID PANEL  01/06/2024    ANNUAL WELLNESS VISIT  01/15/2025    COLORECTAL CANCER SCREENING  01/15/2034    INFLUENZA VACCINE  Completed    Pneumococcal Vaccine 65+  Completed    AAA SCREEN (ONE-TIME)  Completed    COVID-19 Vaccine  Discontinued    TDAP/TD VACCINES  Discontinued    ZOSTER VACCINE  Discontinued                  CMS Preventative Services Quick Reference  Risk Factors Identified During Encounter  Fall Risk-High or Moderate: Discussed Fall Prevention in the home  The above risks/problems have been discussed with the patient.  Pertinent information has been shared with the patient in the After Visit Summary.  An After Visit Summary and PPPS were made available to the patient.    Follow Up:   Next Medicare Wellness visit to be scheduled in 1 year.       Additional E&M Note during same encounter follows:  Patient has multiple medical problems which are significant and separately identifiable that require additional work above and beyond the Medicare Wellness Visit.      Chief Complaint  Establish Care (He cannot stand and move around and his family needed suggestions on what the options are. They would  like to get a 90 supply for all his medications )    Subjective        HPI  [facility] is also being seen today for est care given his past PCP is no longer with our office.     Pamela FISH present for today's visit    He had a stroke in July 2020 with L sided hemiparesis following his stroke. Affected speech, complete loss of L arm and leg and L foot drop.  Some cognitive decline after CVA    On low-dose ASA, atorvastatin for history of CVA and high cholesterol.    Continues on prozac for mood/depression and trazodone for insomnia.      Declines colon cancer screening given concerns with aspiration if he has a pos cologuard and needed colonoscopy.  No bleeding    History of lung nodules and was followed by Dr Cuadra but they could not get biopsies so have decided to stop following the nodules.     Declines covid booster  FLU shot uptodate  Prevnar 20 uptodate    Will return for fasting labs.    Willing for hepC screening with labs.     Objective   Vital Signs:  Wt 102 kg (225 lb) Comment: Family reported    Review of Systems - Negative except ongoing problems with hemiparesis after stroke.  No bleeding.  No falls.  Mood/depression and insomnia controlled with current regimen.  Requires 24 hr care and his sisters and brother-in-law provide care.      Physical Exam  Constitutional:       General: He is not in acute distress.  HENT:      Right Ear: Hearing normal.      Left Ear: Hearing normal.   Pulmonary:      Effort: Pulmonary effort is normal.   Neurological:      Mental Status: Mental status is at baseline.   Psychiatric:         Mood and Affect: Mood normal.                         Assessment and Plan   Diagnoses and all orders for this visit:    1. General medical exam (Primary)  Assessment & Plan:  Discussed together health maintenance and screening along with vaccination options and healthy diet and exercise habits as part of the preventative counseling at their physical exam today.       2.  Screening for colon cancer  Assessment & Plan:  Discussed in detail today    Declines colon cancer screening given risks/concerns with colonoscopy and aspiration after his CVA       3. Need for hepatitis C screening test  -     HCV Antibody Rfx To Qnt PCR; Future    4. History of CVA (cerebrovascular accident)  Assessment & Plan:  S/p CVA in 2020    On ASA and Lipitor    Good home BP checks    Former smoker    Ongoing 24 hr care from his family    Advanced directive on file    Sister is his POA and present for entire visit today     Orders:  -     atorvastatin (LIPITOR) 40 MG tablet; Take 1 tablet by mouth Every Night.  Dispense: 90 tablet; Refill: 1  -     aspirin (Aspirin 81) 81 MG chewable tablet; Chew 1 tablet Every Morning. (OTC)  -     CBC Auto Differential; Future  -     Comprehensive Metabolic Panel; Future  -     Lipid Panel; Future  -     TSH; Future  -     T4, Free; Future    5. Hemiparesis affecting left side as late effect of cerebrovascular accident  Assessment & Plan:  Has reached max benefit from therapy per his sister and POA    They are looking into assistance options at home for care with his health insurance    Orders:  -     atorvastatin (LIPITOR) 40 MG tablet; Take 1 tablet by mouth Every Night.  Dispense: 90 tablet; Refill: 1  -     aspirin (Aspirin 81) 81 MG chewable tablet; Chew 1 tablet Every Morning. (OTC)    6. Mixed hyperlipidemia  Comments:  Blood work and refill meds  Assessment & Plan:  Lipid abnormalities are improving with treatment.  Pharmacotherapy as ordered.  Lipids will be reassessed in 6 months.    Orders:  -     atorvastatin (LIPITOR) 40 MG tablet; Take 1 tablet by mouth Every Night.  Dispense: 90 tablet; Refill: 1  -     CBC Auto Differential; Future  -     Comprehensive Metabolic Panel; Future  -     Lipid Panel; Future  -     TSH; Future  -     T4, Free; Future    7. 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 Their depression is currently in partial remission and the condition is improving with treatment. This will be reassessed at the next regular appointment. F/U as described:patient will continue current medication therapy.    Orders:  -     FLUoxetine (PROzac) 20 MG capsule; Take 3 capsules by mouth Every Morning.  Dispense: 270 capsule; Refill: 1    8. Mild major depression  Assessment & Plan:  Patient's depression is recurrent and is mild without psychosis. Their depression is currently in partial remission and the condition is improving with treatment. This will be reassessed at the next regular appointment. F/U as described:patient will continue current medication therapy.    Orders:  -     FLUoxetine (PROzac) 20 MG capsule; Take 3 capsules by mouth Every Morning.  Dispense: 270 capsule; Refill: 1    9. Insomnia, persistent  Assessment & Plan:  Well controlled with low-dose trazodone    Refilled    Orders:  -     traZODone (DESYREL) 50 MG tablet; Take 1 tablet by mouth Every Night.  Dispense: 90 tablet; Refill: 1    10. Cognitive communication disorder  Assessment & Plan:  At baseline after CVA in 2020      11. Oropharyngeal dysphagia  Assessment & Plan:  Problems with thin liquids after CVA    Refuses thickened liquids    Pts POA understands risks of aspiration      Unable to get wt for BMI given hemiparesis.  Will get at next in-office visit.          Follow Up   Return in about 6 months (around 7/15/2024) for Med recheck.  Patient was given instructions and counseling regarding his condition or for health maintenance advice. Please see specific information pulled into the AVS if appropriate.

## 2024-01-15 NOTE — ASSESSMENT & PLAN NOTE
COPD is unchanged.  No reg meds    Was followed by Dr Cuadra.    No longer smoking    Does not want further followup at this time.

## 2024-01-15 NOTE — ASSESSMENT & PLAN NOTE
S/p CVA in 2020    On ASA and Lipitor    Good home BP checks    Former smoker    Ongoing 24 hr care from his family    Advanced directive on file    Sister is his POA and present for entire visit today

## 2024-01-15 NOTE — ASSESSMENT & PLAN NOTE
Discussed in detail today    Declines colon cancer screening given risks/concerns with colonoscopy and aspiration after his CVA

## 2024-01-30 ENCOUNTER — LAB (OUTPATIENT)
Dept: FAMILY MEDICINE CLINIC | Facility: CLINIC | Age: 69
End: 2024-01-30
Payer: MEDICARE

## 2024-01-30 DIAGNOSIS — Z86.73 HISTORY OF CVA (CEREBROVASCULAR ACCIDENT): ICD-10-CM

## 2024-01-30 DIAGNOSIS — Z11.59 NEED FOR HEPATITIS C SCREENING TEST: ICD-10-CM

## 2024-01-30 DIAGNOSIS — E78.2 MIXED HYPERLIPIDEMIA: ICD-10-CM

## 2024-01-31 LAB
ALBUMIN SERPL-MCNC: 4.4 G/DL (ref 3.9–4.9)
ALBUMIN/GLOB SERPL: 1.6 {RATIO} (ref 1.2–2.2)
ALP SERPL-CCNC: 96 IU/L (ref 44–121)
ALT SERPL-CCNC: 19 IU/L (ref 0–44)
AST SERPL-CCNC: 17 IU/L (ref 0–40)
BASOPHILS # BLD AUTO: 0.1 X10E3/UL (ref 0–0.2)
BASOPHILS NFR BLD AUTO: 1 %
BILIRUB SERPL-MCNC: 0.8 MG/DL (ref 0–1.2)
BUN SERPL-MCNC: 14 MG/DL (ref 8–27)
BUN/CREAT SERPL: 10 (ref 10–24)
CALCIUM SERPL-MCNC: 9.5 MG/DL (ref 8.6–10.2)
CHLORIDE SERPL-SCNC: 107 MMOL/L (ref 96–106)
CHOLEST SERPL-MCNC: 138 MG/DL (ref 100–199)
CO2 SERPL-SCNC: 23 MMOL/L (ref 20–29)
CREAT SERPL-MCNC: 1.38 MG/DL (ref 0.76–1.27)
EGFRCR SERPLBLD CKD-EPI 2021: 55 ML/MIN/1.73
EOSINOPHIL # BLD AUTO: 0.3 X10E3/UL (ref 0–0.4)
EOSINOPHIL NFR BLD AUTO: 3 %
ERYTHROCYTE [DISTWIDTH] IN BLOOD BY AUTOMATED COUNT: 12.6 % (ref 11.6–15.4)
GLOBULIN SER CALC-MCNC: 2.8 G/DL (ref 1.5–4.5)
GLUCOSE SERPL-MCNC: 92 MG/DL (ref 70–99)
HCT VFR BLD AUTO: 50.2 % (ref 37.5–51)
HCV AB SERPL QL IA: NORMAL
HCV IGG SERPL QL IA: NON REACTIVE
HDLC SERPL-MCNC: 53 MG/DL
HGB BLD-MCNC: 17.2 G/DL (ref 13–17.7)
IMM GRANULOCYTES # BLD AUTO: 0.1 X10E3/UL (ref 0–0.1)
IMM GRANULOCYTES NFR BLD AUTO: 1 %
LDLC SERPL CALC-MCNC: 58 MG/DL (ref 0–99)
LYMPHOCYTES # BLD AUTO: 1.2 X10E3/UL (ref 0.7–3.1)
LYMPHOCYTES NFR BLD AUTO: 14 %
MCH RBC QN AUTO: 33 PG (ref 26.6–33)
MCHC RBC AUTO-ENTMCNC: 34.3 G/DL (ref 31.5–35.7)
MCV RBC AUTO: 96 FL (ref 79–97)
MONOCYTES # BLD AUTO: 0.7 X10E3/UL (ref 0.1–0.9)
MONOCYTES NFR BLD AUTO: 8 %
NEUTROPHILS # BLD AUTO: 6.4 X10E3/UL (ref 1.4–7)
NEUTROPHILS NFR BLD AUTO: 73 %
PLATELET # BLD AUTO: 173 X10E3/UL (ref 150–450)
POTASSIUM SERPL-SCNC: 4.5 MMOL/L (ref 3.5–5.2)
PROT SERPL-MCNC: 7.2 G/DL (ref 6–8.5)
RBC # BLD AUTO: 5.21 X10E6/UL (ref 4.14–5.8)
SODIUM SERPL-SCNC: 148 MMOL/L (ref 134–144)
T4 FREE SERPL-MCNC: 1.1 NG/DL (ref 0.82–1.77)
TRIGL SERPL-MCNC: 160 MG/DL (ref 0–149)
TSH SERPL DL<=0.005 MIU/L-ACNC: 1.12 UIU/ML (ref 0.45–4.5)
VLDLC SERPL CALC-MCNC: 27 MG/DL (ref 5–40)
WBC # BLD AUTO: 8.6 X10E3/UL (ref 3.4–10.8)

## 2024-01-31 NOTE — PROGRESS NOTES
THE MESSAGE BELOW IS ABLE TO BE GIVEN BY THE HUB.  THE HUB MAY SCHEDULE A FOLLOW-UP VISIT FOR THE PATIENT IF INDICATED IN THE MESSAGE BELOW...    Please call patient and patient's family with recent lab results:    His recent lipid panel returned good with excellent cholesterol control and improvement in his triglyceride elevation compared to his lab work last year.    His comprehensive metabolic panel returned with stable kidney function with ongoing stage IIIa chronic kidney disease.  We will continue to monitor this with his future labs.  His sodium and chloride levels were little elevated and this can be related to dehydration at the time of his lab work but we will continue to follow this with his future labs.    His liver enzymes returned normal.    His fasting blood sugar returned normal.    His blood count returned normal with no anemia or evidence for infection.    We are still waiting on his thyroid blood work and hepatitis C screening to return and we will contact them with those results when they become available.

## 2024-01-31 NOTE — PROGRESS NOTES
THE MESSAGE BELOW IS ABLE TO BE GIVEN BY THE HUB.  THE HUB MAY SCHEDULE A FOLLOW-UP VISIT FOR THE PATIENT IF INDICATED IN THE MESSAGE BELOW...    Please contact patient with the remainder of his lab results and also let him know about his previous lab result message.    His hepatitis C screening returned negative.    His thyroid function blood work returned normal

## 2024-03-13 ENCOUNTER — TELEPHONE (OUTPATIENT)
Dept: FAMILY MEDICINE CLINIC | Facility: CLINIC | Age: 69
End: 2024-03-13
Payer: MEDICARE

## 2024-03-13 DIAGNOSIS — R41.841 COGNITIVE COMMUNICATION DISORDER: ICD-10-CM

## 2024-03-13 DIAGNOSIS — R13.12 OROPHARYNGEAL DYSPHAGIA: ICD-10-CM

## 2024-03-13 DIAGNOSIS — I69.354 HEMIPARESIS AFFECTING LEFT SIDE AS LATE EFFECT OF CEREBROVASCULAR ACCIDENT: ICD-10-CM

## 2024-03-13 DIAGNOSIS — Z86.73 HISTORY OF CVA (CEREBROVASCULAR ACCIDENT): Primary | ICD-10-CM

## 2024-03-13 NOTE — TELEPHONE ENCOUNTER
Caller: Pamela Inman    Relationship: Emergency Contact    Best call back number: [phone]     What is the best time to reach you: ANYTIME    Who are you requesting to speak with (clinical staff, provider,  specific staff member): DR SHANNON    What was the call regarding: THE PATIENT'S SISTER WOULD LIKE SOME INFORMATION ON HOME HEALTH

## 2024-03-15 NOTE — TELEPHONE ENCOUNTER
Caller: Pamela Inman    Relationship: Emergency Contact    Best call back number: [phone]         What was the call regarding: WANTS TO KNOW IF HE DOES NEED TO GO TO A NURSING HOME AT SOME POINT, WOULD A REFERRAL AND APPOINTMENT BE NEEDED FOR THE PCP. PLEASE CALL TO DISCUSS. WAS TALKING TO [facility] ABOUT THIS BUT DOESN'T WANT TO GO TO NURSING HOME YET.

## 2024-03-18 NOTE — TELEPHONE ENCOUNTER
Caller: Pamela Inman    Relationship: Emergency Contact    Best call back number: [phone]    What was the call regarding: PATIENT'S SISTER AND POA CALLED STATING THAT THE PATIENT NEEDS AUTHORIZATION TO HAVE HOME HEALTH WITH [facility].

## 2024-03-19 ENCOUNTER — TELEPHONE (OUTPATIENT)
Dept: FAMILY MEDICINE CLINIC | Facility: CLINIC | Age: 69
End: 2024-03-19
Payer: MEDICARE

## 2024-03-19 NOTE — TELEPHONE ENCOUNTER
spoke to pt sister and she stated [facility] had said they can only do occupational therapy, speech therapy and physical therapy. Pts family thinks he is way beyond that and they stated they don't help with things he needs such as baths getting out of his wheelchair etc... [facility] and he stated they do not have bath aides he says not a lot of home health normally do that but once a week he said caretenders We willl have pts family call and see if they can give everything they provide I told her if she wants to do a nursing home if they can't then to call us back and schedule a sooner appt with any provider to get process for nursing home started

## 2024-03-19 NOTE — TELEPHONE ENCOUNTER
Spoke to pt sister and she stated [facility] had said they can only do occupational therapy, speech therapy and physical therapy. Pts family thinks he is way beyond that and they stated they don't help with things he needs such as baths getting out of his wheelchair etc... We [facility] with [facility] and he stated they do not have bath aides he says not a lot of home health normally do that but once a week he said caretenders  We willl have pts family  call and see if they can give everything they provide I told her if she wants to do a nursing home if they can't then to call us back and schedule a sooner appt with any provider kareen gbet process for nursing home started

## 2024-03-26 ENCOUNTER — TELEPHONE (OUTPATIENT)
Dept: FAMILY MEDICINE CLINIC | Facility: CLINIC | Age: 69
End: 2024-03-26
Payer: MEDICARE

## 2024-03-26 NOTE — TELEPHONE ENCOUNTER
They would have to do this through the long-term care facility on their own - there is nothing we do to get him sent from home to long-term care.  It is done directly through the long-term care facility.    Given I have never seen him in the office... if he needs to be seen for a physical before admission - he would need to get in for an in-office visit and this can be with any provider that has openings given I have never seen him in the office at this point.

## 2024-03-26 NOTE — TELEPHONE ENCOUNTER
FAMILY CALLED WOULD LIKE SOMEONE TO CALL THEM BACK SISTER [phone] . They are calling to advise what to do. Trying to get Mr Joe into a long care center. Pt has appt with Dr Florez 06/25/2024. If Dr Florez needs to see them sooner and want Dr Florez aware of this move. Family had several questions.

## 2025-07-08 ENCOUNTER — EXTERNAL PBMM DATA (OUTPATIENT)
Dept: PHARMACY | Facility: OTHER | Age: 70
End: 2025-07-08
Payer: MEDICARE

## 2025-07-23 ENCOUNTER — EXTERNAL PBMM DATA (OUTPATIENT)
Dept: PHARMACY | Facility: OTHER | Age: 70
End: 2025-07-23
Payer: MEDICARE